# Patient Record
Sex: FEMALE | Race: ASIAN | Employment: OTHER | URBAN - METROPOLITAN AREA
[De-identification: names, ages, dates, MRNs, and addresses within clinical notes are randomized per-mention and may not be internally consistent; named-entity substitution may affect disease eponyms.]

---

## 2021-11-27 ENCOUNTER — APPOINTMENT (OUTPATIENT)
Dept: RADIOLOGY | Facility: CLINIC | Age: 71
End: 2021-11-27
Attending: EMERGENCY MEDICINE
Payer: MEDICARE

## 2021-11-27 ENCOUNTER — HOSPITAL ENCOUNTER (EMERGENCY)
Facility: CLINIC | Age: 71
Discharge: HOME | End: 2021-11-27
Attending: EMERGENCY MEDICINE | Admitting: EMERGENCY MEDICINE
Payer: MEDICARE

## 2021-11-27 VITALS
OXYGEN SATURATION: 98 % | RESPIRATION RATE: 16 BRPM | HEART RATE: 67 BPM | WEIGHT: 114 LBS | HEIGHT: 62 IN | DIASTOLIC BLOOD PRESSURE: 89 MMHG | BODY MASS INDEX: 20.98 KG/M2 | TEMPERATURE: 97.8 F | SYSTOLIC BLOOD PRESSURE: 159 MMHG

## 2021-11-27 DIAGNOSIS — S62.102A WRIST FRACTURE, LEFT, CLOSED, INITIAL ENCOUNTER: ICD-10-CM

## 2021-11-27 PROCEDURE — 73110 X-RAY EXAM OF WRIST: CPT | Mod: LT

## 2021-11-27 PROCEDURE — 250N000013 HC RX MED GY IP 250 OP 250 PS 637: Performed by: EMERGENCY MEDICINE

## 2021-11-27 PROCEDURE — 99284 EMERGENCY DEPT VISIT MOD MDM: CPT | Mod: 25

## 2021-11-27 PROCEDURE — 25600 CLTX DST RDL FX/EPHYS SEP WO: CPT | Mod: LT

## 2021-11-27 RX ORDER — IBUPROFEN 600 MG/1
600 TABLET, FILM COATED ORAL ONCE
Status: COMPLETED | OUTPATIENT
Start: 2021-11-27 | End: 2021-11-27

## 2021-11-27 RX ORDER — OXYCODONE HYDROCHLORIDE 5 MG/1
5 TABLET ORAL ONCE
Status: COMPLETED | OUTPATIENT
Start: 2021-11-27 | End: 2021-11-27

## 2021-11-27 RX ORDER — ACETAMINOPHEN 325 MG/1
650 TABLET ORAL ONCE
Status: COMPLETED | OUTPATIENT
Start: 2021-11-27 | End: 2021-11-27

## 2021-11-27 RX ORDER — OXYCODONE AND ACETAMINOPHEN 5; 325 MG/1; MG/1
1-2 TABLET ORAL EVERY 4 HOURS PRN
Qty: 12 TABLET | Refills: 0 | Status: SHIPPED | OUTPATIENT
Start: 2021-11-27

## 2021-11-27 RX ADMIN — OXYCODONE HYDROCHLORIDE 5 MG: 5 TABLET ORAL at 21:41

## 2021-11-27 RX ADMIN — ACETAMINOPHEN 650 MG: 325 TABLET ORAL at 21:10

## 2021-11-27 RX ADMIN — IBUPROFEN 600 MG: 600 TABLET ORAL at 21:10

## 2021-11-27 ASSESSMENT — MIFFLIN-ST. JEOR: SCORE: 975.35

## 2021-11-28 NOTE — ED PROVIDER NOTES
EMERGENCY DEPARTMENT ENCOUNTER            IMPRESSION:  Left wrist fracture      MEDICAL DECISION MAKING:  Patient evaluated for isolated left wrist FOOSH injury.  Denies additional injury to the head neck chest torso    On exam blood pressure slightly elevated.  She is mildly uncomfortable.  There is fork deformity to the left wrist.  Tenderness and swelling    She was given pain medication    X-ray showed distal wrist fracture    The wrist was splinted.    Discharged home and instructed to follow-up with orthopedics      =================================================================  CHIEF COMPLAINT:  Chief Complaint   Patient presents with     Wrist Injury         ANGI Marinelli is a 71 year old female with a history of arthritis who presents to the ED by private auto accompanied by daughter for evaluation of left wrist pain.     At ~2010, patient reportedly was in the kitchen with hardwood floor with socks on, and slipped. She instinctively reached out her hand to catch herself, and all the impact landed on her left wrist. She endorses in immediate pain and swelling, and noticed that her wrist was deformed. Her daughter immediately bought her to the ED for further evaluation. She has not had any pain medications but is currently icing her wrist with a bag of frozen corn. She currently rates her pain at a 9/10. Her pain begins in her left wrist and radiations superiorly to half of her forearm and distally to her her fingers. Patient denies any associated head injuries.     Patient denies any history of heart issues, stroke,COPD, diabetes, or asthma. She does reports history of arthritis. Patient is vaccinated with Pfizer. She denies any known sick contacts. Patient denies shortness of breath, chest pain, fever, headache, nausea, vomit, or any other complaints at this time.     Shx: Patient denies drug use or regularly alcohol consumption.     ISiri am serving as a scribe to document services  personally performed by Dr. Nick Miranda MD, based on my observation and the provider's statements to me. I, Dr. Nick Miranda MD attest that Siri Ramos is acting in a scribe capacity, has observed my performance of the services and has documented them in accordance with my direction.      REVIEW OF SYSTEMS   Constitutional: Denies fever, chills, unintentional weight loss or fatigue   Eyes: Denies visual changes or discharge    HENT: Denies sore throat, ear pain or neck pain  Respiratory: Denies cough or shortness of breath    Cardiovascular: Denies chest pain, palpitations or leg swelling  GI: Denies abdominal pain, nausea, vomiting, or dark, bloody stools.    : Denies hematuria, dysuria, or flank pain  Musculoskeletal: Denies any new back pain. Positive for left wrist pain, edema, deformity.   Skin: Denies rash or wound  Neurologic: Denies current headache, new weakness, focal weakness, or sensory changes    Lymphatic: Denies swollen glands    Psychiatric: Denies depression, suicidal ideation or homicidal ideation.    Remainder of systems reviewed, unless noted in HPI all others negative.      PAST MEDICAL HISTORY:  No past medical history on file.    PAST SURGICAL HISTORY:  No past surgical history on file.      CURRENT MEDICATIONS:    oxyCODONE-acetaminophen (PERCOCET) 5-325 MG tablet        ALLERGIES:  No Known Allergies    FAMILY HISTORY:  No family history on file.    SOCIAL HISTORY:   Social History     Socioeconomic History     Marital status: Not on file     Spouse name: Not on file     Number of children: Not on file     Years of education: Not on file     Highest education level: Not on file   Occupational History     Not on file   Tobacco Use     Smoking status: Not on file     Smokeless tobacco: Not on file   Substance and Sexual Activity     Alcohol use: Not on file     Drug use: Not on file     Sexual activity: Not on file   Other Topics Concern     Not on file   Social History Narrative     Not on  "file     Social Determinants of Health     Financial Resource Strain: Not on file   Food Insecurity: Not on file   Transportation Needs: Not on file   Physical Activity: Not on file   Stress: Not on file   Social Connections: Not on file   Intimate Partner Violence: Not on file   Housing Stability: Not on file       PHYSICAL EXAM:    BP (!) 159/89   Pulse 67   Temp 97.8  F (36.6  C) (Oral)   Resp 16   Ht 1.575 m (5' 2\")   Wt 51.7 kg (114 lb)   SpO2 98%   BMI 20.85 kg/m      Constitutional: Awake, alert, in no acute distress  Head: Normocephalic, atraumatic.  ENT: Mucous membranes moist. Posterior oropharynx appears normal.  Eyes: Pupils midrange and reactive ,no conjunctival discharge  Neck: No lymphadenopathy, no stridor, supple, soft tissue swelling  Chest: No tenderness   Respiratory: Respirations even, unlabored. Lungs clear to ascultation bilaterally, in no acute respiratory distress.  Cardiovascular: Regular rate and rhythm.+2 radial pulses, equal bilaterally.  No murmurs.   GI: Abdomen soft, non-tender to palpation in all 4 quadrants. No guarding or rebound. Bowel sounds intact on all 4 quadrants.   Back: No CVA tenderness.    Musculoskeletal: Left wrist as a dorsal formation with tenderness and swelling along the dorsum.  Integument: Skin intact  Lymphatic: No cervical lymphadenopathy  Neurologic: Alert & oriented x 3. Normal speech. Grossly normal motor and sensory function. No focal deficits noted.  NIHSS = 0  Psychiatric: Normal mood and affect. Normal judgement.    ED COURSE:    9:02 PM I met with the patient, obtained history, performed an initial exam, and discussed options and plan for diagnostics and treatment here in the ED.    LAB:  All pertinent labs reviewed and interpreted.  Results for orders placed or performed during the hospital encounter of 11/27/21   XR Wrist Left 3 Views    Impression    IMPRESSION: There is a transverse fracture distal radial metaphysis with intra-articular " extension. The fracture is moderately impacted. The distal fragment is posteriorly displaced approximately 0.7 cm. There is a displaced ulnar styloid avulsion   fracture. No other fracture identified.       RADIOLOGY:  Reviewed all pertinent imaging. Please see official radiology report.  XR Wrist Left 3 Views   Final Result   IMPRESSION: There is a transverse fracture distal radial metaphysis with intra-articular extension. The fracture is moderately impacted. The distal fragment is posteriorly displaced approximately 0.7 cm. There is a displaced ulnar styloid avulsion    fracture. No other fracture identified.           PROCEDURES:   Left long-arm fiberglass splint placed by myself      MEDICATIONS GIVEN IN THE EMERGENCY:  Medications   acetaminophen (TYLENOL) tablet 650 mg (650 mg Oral Given 11/27/21 2110)   ibuprofen (ADVIL/MOTRIN) tablet 600 mg (600 mg Oral Given 11/27/21 2110)   oxyCODONE (ROXICODONE) tablet 5 mg (5 mg Oral Given 11/27/21 2141)           NEW PRESCRIPTIONS STARTED AT TODAY'S ER VISIT:  New Prescriptions    OXYCODONE-ACETAMINOPHEN (PERCOCET) 5-325 MG TABLET    Take 1-2 tablets by mouth every 4 hours as needed for pain          FINAL DIAGNOSIS:    ICD-10-CM    1. Wrist fracture, left, closed, initial encounter  S62.102A             At the conclusion of the encounter I discussed the results of all of the tests and the disposition. The questions were answered. The patient or family acknowledged understanding and was agreeable with the care plan.     NAME: Moises Marinelli  AGE: 71 year old female  YOB: 1950  MRN: [id number]  EVALUATION DATE & TIME: 11/27/2021  8:38 PM    PCP: No primary care provider on file.    ED PROVIDER: Nick Miranda M.D.      Siri MONTAGUE, am serving as a scribe to document services personally performed by Dr. Nick Miranda based on my observation and the provider's statements to me. INick MD attest that Siri Ramos is acting in a scribe capacity, has  observed my performance of the services and has documented them in accordance with my direction.    Nick Miranda M.D.  Emergency Medicine  [facility] EMERGENCY ROOM  [address] [id number]  [phone]  Dept: [phone]  11/27/2021         Nick Miranda MD  11/27/21 2001

## 2021-11-28 NOTE — DISCHARGE INSTRUCTIONS
Your wrist was immobilized with a splint. Wear the splint until seen by orthopedics. You may take at 1000 mg of Tylenol 3 times a day along with 400 mg of Motrin. Percocet prescription written for as needed for pain.

## 2021-11-28 NOTE — ED TRIAGE NOTES
The patient presents to the ED with daughter with injury to left wrist/forearm. Patient fell and put her hand out to catch herself. Has obvious deformity.

## 2021-11-28 NOTE — ED NOTES
Introduced self to patient.  Whiteboard updated.  Plan of care and length of time discussed with patient.  Will continue to monitor. Chandni Celestin RN.......11/27/2021 10:32 PM